# Patient Record
Sex: MALE | Race: WHITE | NOT HISPANIC OR LATINO | Employment: OTHER | URBAN - METROPOLITAN AREA
[De-identification: names, ages, dates, MRNs, and addresses within clinical notes are randomized per-mention and may not be internally consistent; named-entity substitution may affect disease eponyms.]

---

## 2017-09-19 ENCOUNTER — TELEPHONE (OUTPATIENT)
Dept: NEUROLOGY | Facility: HOSPITAL | Age: 82
End: 2017-09-19

## 2017-09-19 NOTE — TELEPHONE ENCOUNTER
----- Message from Sadaf Garcia sent at 9/19/2017 11:25 AM CDT -----  Contact: Pt daughter in law  Alexis can be reached at [phone]  Alexis is calling to get appt back please made a mistake...      Thank you!

## 2017-09-19 NOTE — TELEPHONE ENCOUNTER
Appointment canceled by accident.  Appointment rescheduled with Alexsi on Wednesday, 9/20/17 at 1030am.  [place]uck, daughter, acknowledged understanding.

## 2017-09-20 ENCOUNTER — LAB VISIT (OUTPATIENT)
Dept: LAB | Facility: HOSPITAL | Age: 82
End: 2017-09-20
Attending: INTERNAL MEDICINE
Payer: MEDICARE

## 2017-09-20 ENCOUNTER — OFFICE VISIT (OUTPATIENT)
Dept: NEUROLOGY | Facility: HOSPITAL | Age: 82
End: 2017-09-20
Attending: INTERNAL MEDICINE
Payer: MEDICARE

## 2017-09-20 VITALS
BODY MASS INDEX: 21.72 KG/M2 | TEMPERATURE: 97 F | SYSTOLIC BLOOD PRESSURE: 124 MMHG | HEIGHT: 70 IN | HEART RATE: 68 BPM | WEIGHT: 151.69 LBS | DIASTOLIC BLOOD PRESSURE: 68 MMHG

## 2017-09-20 DIAGNOSIS — D50.0 ANEMIA DUE TO CHRONIC BLOOD LOSS: ICD-10-CM

## 2017-09-20 DIAGNOSIS — D50.0 ANEMIA DUE TO CHRONIC BLOOD LOSS: Primary | ICD-10-CM

## 2017-09-20 LAB
ALBUMIN SERPL BCP-MCNC: 3 G/DL
ALP SERPL-CCNC: 188 U/L
ALT SERPL W/O P-5'-P-CCNC: 13 U/L
ANION GAP SERPL CALC-SCNC: 8 MMOL/L
ANISOCYTOSIS BLD QL SMEAR: ABNORMAL
AST SERPL-CCNC: 19 U/L
BASOPHILS # BLD AUTO: 0.07 K/UL
BASOPHILS NFR BLD: 0.5 %
BILIRUB SERPL-MCNC: 0.2 MG/DL
BUN SERPL-MCNC: 20 MG/DL
CALCIUM SERPL-MCNC: 9.7 MG/DL
CHLORIDE SERPL-SCNC: 109 MMOL/L
CO2 SERPL-SCNC: 26 MMOL/L
CREAT SERPL-MCNC: 1.3 MG/DL
DIFFERENTIAL METHOD: ABNORMAL
EOSINOPHIL # BLD AUTO: 0.7 K/UL
EOSINOPHIL NFR BLD: 5 %
ERYTHROCYTE [DISTWIDTH] IN BLOOD BY AUTOMATED COUNT: 18.9 %
EST. GFR  (AFRICAN AMERICAN): 59 ML/MIN/1.73 M^2
EST. GFR  (NON AFRICAN AMERICAN): 51 ML/MIN/1.73 M^2
FERRITIN SERPL-MCNC: 275 NG/ML
GLUCOSE SERPL-MCNC: 98 MG/DL
HCT VFR BLD AUTO: 27.7 %
HGB BLD-MCNC: 8 G/DL
HYPOCHROMIA BLD QL SMEAR: ABNORMAL
IRON SERPL-MCNC: 50 UG/DL
LYMPHOCYTES # BLD AUTO: 1.5 K/UL
LYMPHOCYTES NFR BLD: 11.3 %
MCH RBC QN AUTO: 19.3 PG
MCHC RBC AUTO-ENTMCNC: 28.9 G/DL
MCV RBC AUTO: 67 FL
MONOCYTES # BLD AUTO: 1.3 K/UL
MONOCYTES NFR BLD: 9.6 %
NEUTROPHILS # BLD AUTO: 9.6 K/UL
NEUTROPHILS NFR BLD: 73.6 %
OVALOCYTES BLD QL SMEAR: ABNORMAL
PLATELET # BLD AUTO: 391 K/UL
PLATELET BLD QL SMEAR: ABNORMAL
PMV BLD AUTO: 9.5 FL
POIKILOCYTOSIS BLD QL SMEAR: SLIGHT
POLYCHROMASIA BLD QL SMEAR: ABNORMAL
POTASSIUM SERPL-SCNC: 4.1 MMOL/L
PROT SERPL-MCNC: 6.3 G/DL
RBC # BLD AUTO: 4.15 M/UL
SATURATED IRON: 14 %
SODIUM SERPL-SCNC: 143 MMOL/L
TOTAL IRON BINDING CAPACITY: 355 UG/DL
TRANSFERRIN SERPL-MCNC: 240 MG/DL
WBC # BLD AUTO: 13.14 K/UL

## 2017-09-20 PROCEDURE — 83540 ASSAY OF IRON: CPT

## 2017-09-20 PROCEDURE — 80053 COMPREHEN METABOLIC PANEL: CPT

## 2017-09-20 PROCEDURE — 85025 COMPLETE CBC W/AUTO DIFF WBC: CPT

## 2017-09-20 PROCEDURE — 82728 ASSAY OF FERRITIN: CPT

## 2017-09-20 PROCEDURE — 36415 COLL VENOUS BLD VENIPUNCTURE: CPT

## 2017-09-20 PROCEDURE — 99214 OFFICE O/P EST MOD 30 MIN: CPT | Performed by: INTERNAL MEDICINE

## 2017-09-20 RX ORDER — MULTIVITAMIN
1 TABLET ORAL DAILY
COMMUNITY
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
## 2017-09-20 NOTE — PATIENT INSTRUCTIONS
You are scheduled for an SBE on _______Thursday, October 5, 2017__________________________    You should eat light meals the day before the procedure and nothing to eat or drink after midnight the night before your procedure.    You will need to be at the 1st floor admission desk at the hospital on ____________[facility] staff will contact with arrival time____________

## 2017-09-25 ENCOUNTER — TELEPHONE (OUTPATIENT)
Dept: NEUROLOGY | Facility: HOSPITAL | Age: 82
End: 2017-09-25

## 2017-09-25 NOTE — TELEPHONE ENCOUNTER
----- Message from Eli Davidson LPN sent at 9/20/2017 12:11 PM CDT -----  SBE on 10/5/17.  CPT 15377, [id number].    Thanks.

## 2017-10-04 ENCOUNTER — ANESTHESIA EVENT (OUTPATIENT)
Dept: ENDOSCOPY | Facility: HOSPITAL | Age: 82
End: 2017-10-04
Payer: MEDICARE
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
## 2017-10-05 ENCOUNTER — HOSPITAL ENCOUNTER (OUTPATIENT)
Facility: HOSPITAL | Age: 82
Discharge: HOME | End: 2017-10-05
Attending: INTERNAL MEDICINE | Admitting: INTERNAL MEDICINE
Payer: MEDICARE

## 2017-10-05 ENCOUNTER — SURGERY (OUTPATIENT)
Age: 82
End: 2017-10-05

## 2017-10-05 ENCOUNTER — ANESTHESIA (OUTPATIENT)
Dept: ENDOSCOPY | Facility: HOSPITAL | Age: 82
End: 2017-10-05
Payer: MEDICARE

## 2017-10-05 DIAGNOSIS — K92.2 SMALL BOWEL BLEED NOT REQUIRING MORE THAN 4 UNITS OF BLOOD IN 24 HOURS, ICU, OR SURGERY: Primary | ICD-10-CM

## 2017-10-05 DIAGNOSIS — Z01.818 PREOP EXAMINATION: ICD-10-CM

## 2017-10-05 PROCEDURE — 25000003 PHARM REV CODE 250: Performed by: INTERNAL MEDICINE

## 2017-10-05 PROCEDURE — 25000003 PHARM REV CODE 250: Performed by: NURSE ANESTHETIST, CERTIFIED REGISTERED

## 2017-10-05 PROCEDURE — 27202087 HC PROBE, APC: Performed by: INTERNAL MEDICINE

## 2017-10-05 PROCEDURE — 44378 SMALL BOWEL ENDOSCOPY: CPT | Performed by: INTERNAL MEDICINE

## 2017-10-05 PROCEDURE — 27201030 HC OVERTUBE: Performed by: INTERNAL MEDICINE

## 2017-10-05 PROCEDURE — 37000008 HC ANESTHESIA 1ST 15 MINUTES: Performed by: INTERNAL MEDICINE

## 2017-10-05 PROCEDURE — 93005 ELECTROCARDIOGRAM TRACING: CPT

## 2017-10-05 PROCEDURE — 37000009 HC ANESTHESIA EA ADD 15 MINS: Performed by: INTERNAL MEDICINE

## 2017-10-05 PROCEDURE — 63600175 PHARM REV CODE 636 W HCPCS: Performed by: NURSE ANESTHETIST, CERTIFIED REGISTERED

## 2017-10-05 RX ORDER — LIDOCAINE HCL/PF 100 MG/5ML
SYRINGE (ML) INTRAVENOUS
Status: DISCONTINUED | OUTPATIENT
Start: 2017-10-05 | End: 2017-10-05

## 2017-10-05 RX ORDER — SUCRALFATE 1 G/1
1 TABLET ORAL 4 TIMES DAILY
COMMUNITY

## 2017-10-05 RX ORDER — ETOMIDATE 2 MG/ML
INJECTION INTRAVENOUS
Status: DISCONTINUED | OUTPATIENT
Start: 2017-10-05 | End: 2017-10-05

## 2017-10-05 RX ORDER — PROPOFOL 10 MG/ML
VIAL (ML) INTRAVENOUS CONTINUOUS PRN
Status: DISCONTINUED | OUTPATIENT
Start: 2017-10-05 | End: 2017-10-05

## 2017-10-05 RX ORDER — FENTANYL CITRATE 50 UG/ML
INJECTION, SOLUTION INTRAMUSCULAR; INTRAVENOUS
Status: DISCONTINUED | OUTPATIENT
Start: 2017-10-05 | End: 2017-10-05

## 2017-10-05 RX ORDER — SODIUM CHLORIDE 9 MG/ML
INJECTION, SOLUTION INTRAVENOUS CONTINUOUS
Status: DISCONTINUED | OUTPATIENT
Start: 2017-10-05 | End: 2017-10-05 | Stop reason: HOSPADM

## 2017-10-05 RX ORDER — PROPOFOL 10 MG/ML
VIAL (ML) INTRAVENOUS
Status: DISCONTINUED | OUTPATIENT
Start: 2017-10-05 | End: 2017-10-05

## 2017-10-05 RX ADMIN — PROPOFOL 150 MCG/KG/MIN: 10 INJECTION, EMULSION INTRAVENOUS at 01:10

## 2017-10-05 RX ADMIN — SODIUM CHLORIDE: 0.9 INJECTION, SOLUTION INTRAVENOUS at 10:10

## 2017-10-05 RX ADMIN — LIDOCAINE HYDROCHLORIDE 40 MG: 20 INJECTION, SOLUTION INTRAVENOUS at 01:10

## 2017-10-05 RX ADMIN — PROPOFOL 20 MG: 10 INJECTION, EMULSION INTRAVENOUS at 01:10

## 2017-10-05 RX ADMIN — FENTANYL CITRATE 25 MCG: 50 INJECTION, SOLUTION INTRAMUSCULAR; INTRAVENOUS at 01:10

## 2017-10-05 RX ADMIN — ETOMIDATE 6 MG: 2 INJECTION, SOLUTION INTRAVENOUS at 01:10

## 2017-10-05 NOTE — ANESTHESIA POSTPROCEDURE EVALUATION
"Anesthesia Post Evaluation    Patient: Refugio Bo    Procedure(s) Performed: Procedure(s) (LRB):  Upper single balloon with general or MAC (N/A)    Final Anesthesia Type: MAC  Patient location during evaluation: GI PACU  Patient participation: Yes- Able to Participate  Level of consciousness: awake and alert and oriented  Post-procedure vital signs: reviewed and stable  Pain management: adequate  Airway patency: patent  PONV status at discharge: No PONV  Anesthetic complications: no      Cardiovascular status: blood pressure returned to baseline and hemodynamically stable  Respiratory status: unassisted, spontaneous ventilation and room air  Hydration status: euvolemic  Follow-up not needed.        Visit Vitals  BP (!) 153/69   Pulse 69   Temp 37.2 °C (99 °F) (Oral)   Resp 18   Ht 5' 10" (1.778 m)   Wt 68 kg (150 lb)   SpO2 99%   BMI 21.52 kg/m²       Pain/Esthela Score: Pain Assessment Performed: Yes (10/5/2017 10:35 AM)  Presence of Pain: denies (10/5/2017 10:35 AM)  Esthela Score: 9 (10/5/2017  1:22 PM)      "

## 2017-10-05 NOTE — ANESTHESIA PREPROCEDURE EVALUATION
10/04/2017  Refugio Bo is a 82 y.o., male for upper balloon endoscopy under MAC/GA    Anesthesia Evaluation     I have reviewed the Nursing Notes.   I have reviewed the Medications.     Review of Systems  Anesthesia Hx:   Denies Personal Hx of Anesthesia complications.   Social:  Former Smoker   Hematology/Oncology:         -- Anemia: --  Cancer in past history:  Oncology Comments: prostate     EENT/Dental:   [place]   Cardiovascular:   Exercise tolerance: poor ECG has been reviewed. No CP or syncope with normal activity, but activity is decreased; occasional dizziness he attributes to anemia   Renal/:   renal calculi Cr 1.3   Hepatic/GI:   GERD Liver disease: a. Anemia of GI origin   Musculoskeletal:   Arthritis     Neurological:   TIA,        Physical Exam  General:  Cachexia    Airway/Jaw/Neck:  Airway Findings: Mouth Opening: Small, but > 3cm Mallampati: III  TM Distance: 4 - 6 cm  Jaw/Neck Findings:  Neck ROM: Extension Decreased, Mod.      Dental:  Dental Findings: Upper Dentures, Lower Dentures   Chest/Lungs:  Chest/Lungs Findings: Decreased Breathe Sounds Left, Decreased Breathe Sounds Right, Normal Respiratory Rate     Heart/Vascular:  Heart Findings: Rate: Normal  Rhythm: Regular Rhythm  Heart Murmur           Lab Results   Component Value Date    WBC 13.14 (H) 09/20/2017    HGB 8.0 (L) 09/20/2017    HCT 27.7 (L) 09/20/2017     (H) 09/20/2017    ALT 13 09/20/2017    AST 19 09/20/2017     09/20/2017    K 4.1 09/20/2017     09/20/2017    CREATININE 1.3 09/20/2017    BUN 20 09/20/2017    CO2 26 09/20/2017         Anesthesia Plan  Type of Anesthesia, risks & benefits discussed:  Anesthesia Type:  MAC, general  Patient's Preference:   Intra-op Monitoring Plan:   Intra-op Monitoring Plan Comments:   Post Op Pain Control Plan:   Post Op Pain Control Plan Comments:   Induction:    Beta  Blocker:  Patient is not currently on a Beta-Blocker (No further documentation required).       Informed Consent: Patient understands risks and agrees with Anesthesia plan.  Questions answered. Anesthesia consent signed with patient.  ASA Score: 3     Day of Surgery Review of History & Physical:            Ready For Surgery From Anesthesia Perspective.

## 2017-10-05 NOTE — DISCHARGE INSTRUCTIONS
Post Small Bowel Enteroscopy (Antegrade) Discharge Instructions:    Refugio Bo  10/5/2017  Eulalio Johnston MD    1. Do Not eat or drink anything for 1 hour. Try sips of water first. If tolerated, resume your regular diet or one recommended by your physician.  2. Do not drive a car or operate machinery, make critical decisions, or do activities that require coordination or balance for 24 hours.  3. You may experience a sore throat for 24 to 48 hours. You may use throat lozenges or gargle with warm, salt water to relieve the discomfort.  4. Because air was put into your stomach/bowel during the procedure, you may experience some belching.  5. Go directly to the emergency room if you notice any of the following:                              Chills and/or fever over 101                Persistent vomiting or vomiting with blood/nasal regurgitation                Severe abdominal pain, other than gas cramps                Severe chest pain                Black, tarry stools    If you have any questions or problems, please call your Physician:    Eulalio Johnston MD    Lab Results: [phone]    If a complication or emergency situation arises and you are unable to reach your Physician - GO TO THE EMERGENCY ROOM.

## 2017-10-05 NOTE — TRANSFER OF CARE
"Anesthesia Transfer of Care Note    Patient: Refugio Bo    Procedure(s) Performed: Procedure(s) (LRB):  Upper single balloon with general or MAC (N/A)    Patient location: GI    Anesthesia Type: MAC    Transport from OR: Transported from OR on room air with adequate spontaneous ventilation    Post pain: adequate analgesia    Post assessment: no apparent anesthetic complications and tolerated procedure well    Post vital signs: stable    Level of consciousness: awake, alert and oriented    Nausea/Vomiting: no nausea/vomiting    Complications: none    Transfer of care protocol was followed      Last vitals:   Visit Vitals  BP (!) 153/69   Pulse 69   Temp 37.2 °C (99 °F) (Oral)   Resp 18   Ht 5' 10" (1.778 m)   Wt 68 kg (150 lb)   SpO2 99%   BMI 21.52 kg/m²     "

## 2017-10-06 VITALS
HEIGHT: 70 IN | BODY MASS INDEX: 21.47 KG/M2 | HEART RATE: 69 BPM | TEMPERATURE: 99 F | RESPIRATION RATE: 19 BRPM | SYSTOLIC BLOOD PRESSURE: 139 MMHG | OXYGEN SATURATION: 98 % | DIASTOLIC BLOOD PRESSURE: 69 MMHG | WEIGHT: 150 LBS